# Patient Record
Sex: MALE | Race: WHITE
[De-identification: names, ages, dates, MRNs, and addresses within clinical notes are randomized per-mention and may not be internally consistent; named-entity substitution may affect disease eponyms.]

---

## 2013-02-21 VITALS — DIASTOLIC BLOOD PRESSURE: 75 MMHG | SYSTOLIC BLOOD PRESSURE: 152 MMHG

## 2017-10-10 ENCOUNTER — HOSPITAL ENCOUNTER (OUTPATIENT)
Dept: HOSPITAL 44 - LAB | Age: 77
End: 2017-10-10
Attending: FAMILY MEDICINE
Payer: MEDICARE

## 2017-10-10 DIAGNOSIS — E78.5: ICD-10-CM

## 2017-10-10 DIAGNOSIS — Z12.5: ICD-10-CM

## 2017-10-10 DIAGNOSIS — J43.1: Primary | ICD-10-CM

## 2017-10-10 LAB
EGFR (AFRICAN): > 60
EGFR (NON-AFRICAN): > 60

## 2017-10-10 PROCEDURE — 80053 COMPREHEN METABOLIC PANEL: CPT

## 2017-10-10 PROCEDURE — 80061 LIPID PANEL: CPT

## 2017-10-10 PROCEDURE — 84153 ASSAY OF PSA TOTAL: CPT

## 2017-10-10 PROCEDURE — 36415 COLL VENOUS BLD VENIPUNCTURE: CPT

## 2018-02-15 ENCOUNTER — HOSPITAL ENCOUNTER (EMERGENCY)
Dept: HOSPITAL 44 - ED | Age: 78
Discharge: ANOTHER HOSPITAL | End: 2018-02-15
Payer: MEDICARE

## 2018-02-15 VITALS
DIASTOLIC BLOOD PRESSURE: 74 MMHG | DIASTOLIC BLOOD PRESSURE: 74 MMHG | DIASTOLIC BLOOD PRESSURE: 74 MMHG | SYSTOLIC BLOOD PRESSURE: 159 MMHG | SYSTOLIC BLOOD PRESSURE: 159 MMHG | SYSTOLIC BLOOD PRESSURE: 159 MMHG

## 2018-02-15 DIAGNOSIS — I48.91: ICD-10-CM

## 2018-02-15 DIAGNOSIS — I50.41: ICD-10-CM

## 2018-02-15 DIAGNOSIS — R09.02: Primary | ICD-10-CM

## 2018-02-15 LAB
EGFR (AFRICAN): > 60
EGFR (NON-AFRICAN): > 60
MCH RBC QN AUTO: 32.9 PG (ref 28–34)
MCV RBC AUTO: 101.5 FL (ref 80–100)

## 2018-02-15 PROCEDURE — 93005 ELECTROCARDIOGRAM TRACING: CPT

## 2018-02-15 PROCEDURE — 94640 AIRWAY INHALATION TREATMENT: CPT

## 2018-02-15 PROCEDURE — 71045 X-RAY EXAM CHEST 1 VIEW: CPT

## 2018-02-15 PROCEDURE — 85025 COMPLETE CBC W/AUTO DIFF WBC: CPT

## 2018-02-15 PROCEDURE — 83605 ASSAY OF LACTIC ACID: CPT

## 2018-02-15 PROCEDURE — 83880 ASSAY OF NATRIURETIC PEPTIDE: CPT

## 2018-02-15 PROCEDURE — 84484 ASSAY OF TROPONIN QUANT: CPT

## 2018-02-15 PROCEDURE — 87040 BLOOD CULTURE FOR BACTERIA: CPT

## 2018-02-15 PROCEDURE — 81002 URINALYSIS NONAUTO W/O SCOPE: CPT

## 2018-02-15 PROCEDURE — 96365 THER/PROPH/DIAG IV INF INIT: CPT

## 2018-02-15 PROCEDURE — 99284 EMERGENCY DEPT VISIT MOD MDM: CPT

## 2018-02-15 PROCEDURE — 80053 COMPREHEN METABOLIC PANEL: CPT

## 2018-02-15 PROCEDURE — S1016 NON-PVC INTRAVENOUS ADMINIST: HCPCS

## 2018-02-15 NOTE — DIAGNOSTIC IMAGING REPORT
RUPERTO GASPAR (NP) - ER 

[facility]

[address] [facility].[address] [address]

 

 

 

 

Report Submission Date: Feb 15, 2018 9:19:09 PM CST

Patient       Study

Name:   LAWRENCE GHOSH       Date:   Feb 15, 2018 9:05:33 PM CST

MRN:          Modality Type:   CR

Gender:   M       Description:   CHEST

:   [birth year]       Institution:   [facility]

Physician:   RUPERTO GASPAR (CHANO) - ER

     Accession:    [id number]

 

 

Chest , 1 view 



History: COUGH, HYPOXIA 





Findings: The heart size is normal. There is mild central pulmonary vascular 
congestion is present with mild bibasilar infiltrate/atelectasis noted. Also 
there is a 2.1 x 1.3 cm nodule within the left upper lobe suprahilar region 
present. No pleural effusion or pneumothorax identified. 





Impression: 

1. Mild central pulmonary vascular congestion with mild bibasilar infiltrate/
atelectasis present. 



2. Left upper lobe suprahilar pulmonary nodule

 

Electronically signed on Feb 15, 2018 9:19:09 PM CST by:

Eleazar Xiao
[facility]

## 2018-02-15 NOTE — ED PHYSICIAN DOCUMENTATION
Dyspnea





- HISTORIAN


Historian: patient





- HPI


Chief Complaint: Dyspnea


Onset: days ago (14)


Duration: worse


Initiating Event: upper respiratory illness


Severity: severe


Exacerbated By: coughing


Associated Symptoms: chills, fever, chest discomfort (related to coughing), 

productive cough.  denies: chest pain, heart racing, dizziness, light-headedness

, anxiety, hands tingling, face tingling, muscle spasms


Further Comments: yes (77 year old male patient brought in by family for 

evaluation of cough, chills, body aches, and fatigue.  Patient reports symptoms 

began 1 week ago and have gotten worse.  Has been using his advair at home. Is 

out of his Proair.  RA Sat 81-82% on arrival.)





- ROS


CONST: weakness


EYES/ENT: nasal congestion.  denies: problems with vision, sore throat, nasal 

drainage


GI/: denies: vomiting, nausea, diarrhea


NEURO/PSYCH: denies: headache


MS/SKIN/LYMPH: none





- PAST HX


Lung Disease: COPD


Allergies/Adverse Reactions: 


 Allergies











Allergy/AdvReac Type Severity Reaction Status Date / Time


 


No Known Allergies Allergy   Verified 02/15/18 20:58

















- SOCIAL HX


Smoking History: cigarettes





- FAMILY HX


Family History: denies: none





- VITAL SIGNS


Vital Signs: 





 Vital Signs











Temp Pulse Resp BP Pulse Ox


 


          152/75    


 


          02/21/13 18:45   














- REVIEWED ASSESSMENTS


Nursing Assessment  Reviewed: Yes


Vitals Reviewed: Yes





Progress





- Progress


Progress: 





Patient placed on O2 at 5L NC - Sat improved to 92%, RR 20-22; duoneb started. 





No influenza swabs available 





2145 Intermittent run Afib with RVR, frequent PACs noted. 





2215 Reviewed lab and xray results with patient and daughter, daughter would 

like patient transferred to [facility]. 





2220 Call to [facility]





2255 Call from Dr Leung, cardiology from [facility]. Patient accepted for transfer.

  





- EKG/XRAY/CT


EKG: rhythm (ST, rate 105)





ED Results Lab/Radiology





- Radiology


Radiology Impressions: 





Chest , 1 view





History: COUGH, HYPOXIA








Findings: The heart size is normal. There is mild central pulmonary vascular 

congestion is present with mild bibasilar infiltrate/atelectasis noted. Also 

there is a 2.1 x 1.3 cm nodule within the left upper lobe suprahilar region 

present. No pleural effusion or pneumothorax identified.








Impression:


1. Mild central pulmonary vascular congestion with mild bibasilar infiltrate/

atelectasis present.





2. Left upper lobe suprahilar pulmonary nodule


 


Electronically signed on Feb 15, 2018 9:19:09 PM CST by:


Eleazar Xiao





- Orders


Orders: 





 ED Orders











 Category Date Time Status


 


 Continuous EKG monitoring Q30M Care  02/15/18 20:49 Active


 


 Continuous Pulse Oximetry Q30M Care  02/15/18 20:49 Active


 


 CHEST 1VIEW [RAD] Stat Exams  02/15/18 20:49 Ordered


 


 BLOOD CULTURE Stat Lab  02/15/18 20:51 Received


 


 CBC/PLATELET/DIFF Stat Lab  02/15/18 20:51 Received


 


 CMP Stat Lab  02/15/18 20:51 Received


 


 TROPONIN I (cTnI) Stat Lab  02/15/18 20:51 Received


 


 UA W/MICRO IF INDICATED Stat Lab  02/15/18 20:49 Ordered


 


 Ipratropium/Albuterol Sulfate [Duoneb] Med  02/15/18 20:19 Discontinued





 6 ml NEB .STK-MED ONE   


 


 Oxygen Daily Oxygen  02/15/18 21:00 Ordered


 


 EKG WITH COMPARISON Stat Ther  02/15/18 20:49 Ordered














Dyspnea Physical Exam





- EXAM


General Appearance: moderate distress


EENT: eye inspection normal, pharynx normal, no signs of dehydration, ALLEN, no 

nystagmus, TM's nml, dry mucous membranes


Respiratory: no pain on inspiration, decreased air movement (bases), wheezes (

expiratoy), rales (bilateral)


CVS: no murmur, no gallop, no friction rub, pulses full, pulses equal, 

tachycardia


Abdomen: non-tender, no organomegaly, no distention, no ascites


Skin: no rash, cyanosis, warm, nml palp., dry


Extremities: non-tender, normal range of motion, no evidence of injury, no edema

, J, NP


Neuro/Psych: oriented x3, CN's nml as tested, motor nml, sensation nml, mood/

affect nml





Discharge


Clincal Impression: 


 Hypoxemia requiring supplemental oxygen, Intermittent atrial fibrillation, 

Elevated LFTs





CHF (congestive heart failure)


Qualifiers:


 Congestive heart failure type: combined Congestive heart failure chronicity: 

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 

diastolic (congestive) heart failure





Referrals: 


Jef Quintero MD [Primary Care Provider] - 2 Days


Condition: Stable


Disposition: 02 XFER SHT-TRM HOSP


Decision to Admit: NO


Decision Time: 23:06

## 2018-02-16 LAB
ANISOCYTOSIS BLD QL SMEAR: (no result)
APPEARANCE UR: CLEAR
COLOR,URINE: YELLOW
MACROCYTES BLD QL SMEAR: (no result)
MONOCYTES %: 5 % (ref 0–11)
PH UR STRIP: 5.5 [PH] (ref 5–8)
RBC UR QL: (no result)
SEGMENTED NEUTROPHILS %: 69 % (ref 39–79)
UROBILINOGEN URINE: 1 EU (ref 0.2–1)

## 2018-05-25 ENCOUNTER — HOSPITAL ENCOUNTER (OUTPATIENT)
Dept: HOSPITAL 44 - LAB | Age: 78
End: 2018-05-25
Attending: PHYSICIAN ASSISTANT
Payer: MEDICARE
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
## 2018-05-25 DIAGNOSIS — S69.92XA: Primary | ICD-10-CM
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
## 2018-05-25 PROCEDURE — 73130 X-RAY EXAM OF HAND: CPT

## 2018-05-25 NOTE — DIAGNOSTIC IMAGING REPORT
IGNACIO BARAJAS 

[facility]

[address] [facility] [address] [place] [address]. [id number]

 

 

 

 

Report Submission Date: May 25, 2018 2:52:07 PM CDT

Patient       Study

Name:   LAWRENCE GHOSH       Date:   May 25, 2018 10:01:25 AM CDT

MRN:   [id number]       Modality Type:   DX

Gender:   M       Description:   UPPER EXTREMITY

:   [birth year]       Institution:   [facility]

Physician:   IGNACIO BARAJAS

     Accession:    [id number]

 

 

HISTORY:   78-year-old male with left hand laceration between the second and 
third fingers, injury 5 days ago, evaluate for foreign body 



COMPARISON: None available 



TECHNIQUE: Three views of the left hand were performed.   



FINDINGS: No acute fracture or dislocation about the left hand.  No radiopaque 
foreign bodies are identified in the soft tissues between the second and third 
fingers.  There is a 2 mm calcification located dorsal to the second finger 
proximal phalangeal neck.  There is mild to moderate osteoarthritis throughout 
the left hand and wrist, greater radially, and osteoarthritis of the DRUJ.   



IMPRESSION:   



1.  No acute fracture of the left hand. 

2.  Osteoarthritis. 

3.  No radiopaque foreign bodies are identified in the area of injury.

 

Electronically signed on May 25, 2018 2:52:07 PM CDT by:

Sony RADFORD

## 2018-06-11 ENCOUNTER — HOSPITAL ENCOUNTER (OUTPATIENT)
Dept: HOSPITAL 44 - LAB | Age: 78
End: 2018-06-11
Attending: FAMILY MEDICINE
Payer: MEDICARE

## 2018-06-11 DIAGNOSIS — I10: Primary | ICD-10-CM

## 2018-06-11 LAB
EGFR (AFRICAN): > 60
EGFR (NON-AFRICAN): > 60

## 2018-06-11 PROCEDURE — 80053 COMPREHEN METABOLIC PANEL: CPT

## 2018-06-11 PROCEDURE — 36415 COLL VENOUS BLD VENIPUNCTURE: CPT
